# Patient Record
Sex: MALE | Race: BLACK OR AFRICAN AMERICAN | NOT HISPANIC OR LATINO
[De-identification: names, ages, dates, MRNs, and addresses within clinical notes are randomized per-mention and may not be internally consistent; named-entity substitution may affect disease eponyms.]

---

## 2023-03-07 ENCOUNTER — P2P PATIENT RECORD (OUTPATIENT)
Age: 68
End: 2023-03-07

## 2023-03-30 ENCOUNTER — OFFICE VISIT (OUTPATIENT)
Dept: URBAN - METROPOLITAN AREA CLINIC 84 | Facility: CLINIC | Age: 68
End: 2023-03-30
Payer: MEDICARE

## 2023-03-30 ENCOUNTER — DASHBOARD ENCOUNTERS (OUTPATIENT)
Age: 68
End: 2023-03-30

## 2023-03-30 ENCOUNTER — WEB ENCOUNTER (OUTPATIENT)
Dept: URBAN - METROPOLITAN AREA CLINIC 84 | Facility: CLINIC | Age: 68
End: 2023-03-30

## 2023-03-30 VITALS
HEIGHT: 72 IN | TEMPERATURE: 97.5 F | BODY MASS INDEX: 37.65 KG/M2 | HEART RATE: 59 BPM | SYSTOLIC BLOOD PRESSURE: 187 MMHG | WEIGHT: 278 LBS | DIASTOLIC BLOOD PRESSURE: 108 MMHG

## 2023-03-30 DIAGNOSIS — Z86.010 PERSONAL HISTORY OF COLON POLYPS: ICD-10-CM

## 2023-03-30 PROCEDURE — 99202 OFFICE O/P NEW SF 15 MIN: CPT | Performed by: INTERNAL MEDICINE

## 2023-03-30 RX ORDER — DOXAZOSIN MESYLATE 8 MG/1
TABLET ORAL
Qty: 135 TABLET | Status: ACTIVE | COMMUNITY

## 2023-03-30 RX ORDER — LOSARTAN POTASSIUM 50 MG/1
TABLET, FILM COATED ORAL
Qty: 90 TABLET | Status: ACTIVE | COMMUNITY

## 2023-03-30 RX ORDER — METOPROLOL SUCCINATE 50 MG/1
TABLET, EXTENDED RELEASE ORAL
Qty: 180 TABLET | Status: ACTIVE | COMMUNITY

## 2023-03-30 RX ORDER — POTASSIUM CHLORIDE 1500 MG/1
TABLET, EXTENDED RELEASE ORAL
Qty: 90 TABLET | Status: ACTIVE | COMMUNITY

## 2023-03-30 RX ORDER — CLOTRIMAZOLE 10 MG/G
CREAM TOPICAL
Qty: 45 GRAM | Status: ACTIVE | COMMUNITY

## 2023-03-30 RX ORDER — METOPROLOL TARTRATE 100 MG/1
TABLET, FILM COATED ORAL
Qty: 0 | Refills: 0 | Status: DISCONTINUED | COMMUNITY
Start: 1900-01-01

## 2023-03-30 RX ORDER — CHOLECALCIFEROL (VITAMIN D3) 1250 MCG
TAKE ONE CAPSULE BY MOUTH ONCE PER WEEK CAPSULE ORAL
Qty: 12 UNSPECIFIED | Refills: 0 | Status: ACTIVE | COMMUNITY

## 2023-03-30 RX ORDER — POLYETHYLENE GLYCOL 3350, SODIUM CHLORIDE, SODIUM BICARBONATE, POTASSIUM CHLORIDE 420; 11.2; 5.72; 1.48 G/4L; G/4L; G/4L; G/4L
AS DIRECTED POWDER, FOR SOLUTION ORAL ONCE
Qty: 1 BOTTLE | Refills: 0 | OUTPATIENT
Start: 2023-03-30 | End: 2023-03-31

## 2023-03-30 RX ORDER — HYDROCHLOROTHIAZIDE 25 MG/1
TABLET ORAL
Qty: 0 | Refills: 0 | Status: DISCONTINUED | COMMUNITY
Start: 1900-01-01

## 2023-03-30 RX ORDER — AMLODIPINE BESYLATE 10 MG/1
TAKE 1 TABLET (10 MG) BY ORAL ROUTE ONCE DAILY TABLET ORAL 1
Qty: 0 | Refills: 0 | Status: ACTIVE | COMMUNITY
Start: 1900-01-01

## 2023-03-30 RX ORDER — POTASSIUM CHLORIDE 1.5 G/1
POWDER, FOR SOLUTION ORAL
Qty: 0 | Refills: 0 | Status: DISCONTINUED | COMMUNITY
Start: 1900-01-01

## 2023-03-30 RX ORDER — SERTRALINE 50 MG/1
TABLET, FILM COATED ORAL
Qty: 0 | Refills: 0 | Status: ACTIVE | COMMUNITY
Start: 1900-01-01

## 2023-03-30 RX ORDER — ERGOCALCIFEROL 1.25 MG/1
CAPSULE ORAL
Qty: 0 | Refills: 0 | Status: DISCONTINUED | COMMUNITY
Start: 1900-01-01

## 2023-03-30 RX ORDER — HYDRALAZINE HYDROCHLORIDE 100 MG/1
TABLET ORAL
Qty: 0 | Refills: 0 | Status: ACTIVE | COMMUNITY
Start: 1900-01-01

## 2023-03-30 RX ORDER — CALCITRIOL 0.25 UG/1
CAPSULE ORAL
Qty: 0 | Refills: 0 | Status: DISCONTINUED | COMMUNITY
Start: 1900-01-01

## 2023-03-30 NOTE — HPI-TODAY'S VISIT:
Subjective   Rylie Salazar is a 52 y.o. female.     52-year-old with obesity follow-up first month with anorexic's-will require glucagon- like peptide 1 receptor agonists injections if not successful with this route of treatment--see 2 months --continue exercise and 8000 steps a day--the patient's BMI is 33.4      The following portions of the patient's history were reviewed and updated as appropriate: allergies, current medications, past family history, past medical history, past social history, past surgical history and problem list.    Review of Systems   Constitutional: Positive for unexpected weight change and weight loss.        See HPI--BMI 33.4   Respiratory: Negative for shortness of breath.    Cardiovascular: Negative for chest pain and palpitations.   Musculoskeletal:        Patient started rowing machine and acquired a standing desk       Objective   Physical Exam  Vitals and nursing note reviewed.   Constitutional:       Appearance: She is obese.   Eyes:      Extraocular Movements: Extraocular movements intact.      Pupils: Pupils are equal, round, and reactive to light.   Cardiovascular:      Rate and Rhythm: Normal rate and regular rhythm.   Pulmonary:      Effort: Pulmonary effort is normal.      Breath sounds: Normal breath sounds.   Musculoskeletal:      Right lower leg: No edema.      Left lower leg: No edema.   Skin:     General: Skin is warm and dry.   Neurological:      General: No focal deficit present.      Mental Status: She is alert and oriented to person, place, and time.   Psychiatric:         Mood and Affect: Mood normal.         Behavior: Behavior normal.         Thought Content: Thought content normal.         Judgment: Judgment normal.         Assessment/Plan   Diagnoses and all orders for this visit:    1. Morbid (severe) obesity due to excess calories (HCC) (Primary)  -     phentermine 37.5 MG capsule; Take 1 capsule by mouth Every Morning.  Dispense: 60 capsule; Refill: 2      This patient was referred by Dr. Majo Brown for an evaluation of colonoscopy.  A copy of this will be sent to the referring provider. Overall the patient has been feeling well.  The patient denies anorexia or weight loss.  In general the patient has regular BM's.  In general the patient denies constipation, diarrhea, hematochezia, BRBPR, or melena.  The patient denies abdominal pain.  The patient denies GERD/N/V/dysphagia.  There is no FHx of colon cancer.  The patient has had prior colonoscopy.  He has prior colon polps.  Colonoscopy in 2019 was done with a 2 day prep and there was a lot of residual stool.  According to the patient he only did a 1 day prep     Plan above x2 months-may require injections if does not improve see HPI

## 2023-05-23 ENCOUNTER — TELEPHONE ENCOUNTER (OUTPATIENT)
Dept: URBAN - METROPOLITAN AREA CLINIC 84 | Facility: CLINIC | Age: 68
End: 2023-05-23

## 2023-05-23 ENCOUNTER — CLAIMS CREATED FROM THE CLAIM WINDOW (OUTPATIENT)
Dept: URBAN - METROPOLITAN AREA CLINIC 4 | Facility: CLINIC | Age: 68
End: 2023-05-23
Payer: MEDICARE

## 2023-05-23 ENCOUNTER — OFFICE VISIT (OUTPATIENT)
Dept: URBAN - METROPOLITAN AREA SURGERY CENTER 20 | Facility: SURGERY CENTER | Age: 68
End: 2023-05-23
Payer: MEDICARE

## 2023-05-23 DIAGNOSIS — D12.3 ADENOMA OF TRANSVERSE COLON: ICD-10-CM

## 2023-05-23 DIAGNOSIS — D12.5 ADENOMA OF SIGMOID COLON: ICD-10-CM

## 2023-05-23 DIAGNOSIS — D12.3 BENIGN NEOPLASM OF TRANSVERSE COLON: ICD-10-CM

## 2023-05-23 DIAGNOSIS — Z86.010 ADENOMAS PERSONAL HISTORY OF COLONIC POLYPS: ICD-10-CM

## 2023-05-23 PROCEDURE — 88305 TISSUE EXAM BY PATHOLOGIST: CPT | Performed by: PATHOLOGY

## 2023-05-23 PROCEDURE — G8907 PT DOC NO EVENTS ON DISCHARG: HCPCS | Performed by: INTERNAL MEDICINE

## 2023-05-23 PROCEDURE — 45380 COLONOSCOPY AND BIOPSY: CPT | Performed by: INTERNAL MEDICINE

## 2023-05-23 RX ORDER — AMLODIPINE BESYLATE 10 MG/1
TAKE 1 TABLET (10 MG) BY ORAL ROUTE ONCE DAILY TABLET ORAL 1
Qty: 0 | Refills: 0 | Status: ACTIVE | COMMUNITY
Start: 1900-01-01

## 2023-05-23 RX ORDER — CHOLECALCIFEROL (VITAMIN D3) 1250 MCG
TAKE ONE CAPSULE BY MOUTH ONCE PER WEEK CAPSULE ORAL
Qty: 12 UNSPECIFIED | Refills: 0 | Status: ACTIVE | COMMUNITY

## 2023-05-23 RX ORDER — HYDRALAZINE HYDROCHLORIDE 100 MG/1
TABLET ORAL
Qty: 0 | Refills: 0 | Status: ACTIVE | COMMUNITY
Start: 1900-01-01

## 2023-05-23 RX ORDER — POTASSIUM CHLORIDE 1500 MG/1
TABLET, EXTENDED RELEASE ORAL
Qty: 90 TABLET | Status: ACTIVE | COMMUNITY

## 2023-05-23 RX ORDER — LOSARTAN POTASSIUM 50 MG/1
TABLET, FILM COATED ORAL
Qty: 90 TABLET | Status: ACTIVE | COMMUNITY

## 2023-05-23 RX ORDER — DOXAZOSIN MESYLATE 8 MG/1
TABLET ORAL
Qty: 135 TABLET | Status: ACTIVE | COMMUNITY

## 2023-05-23 RX ORDER — METOPROLOL SUCCINATE 50 MG/1
TABLET, EXTENDED RELEASE ORAL
Qty: 180 TABLET | Status: ACTIVE | COMMUNITY

## 2023-05-23 RX ORDER — SERTRALINE 50 MG/1
TABLET, FILM COATED ORAL
Qty: 0 | Refills: 0 | Status: ACTIVE | COMMUNITY
Start: 1900-01-01

## 2023-05-23 RX ORDER — CLOTRIMAZOLE 10 MG/G
CREAM TOPICAL
Qty: 45 GRAM | Status: ACTIVE | COMMUNITY